# Patient Record
Sex: FEMALE | Race: WHITE | NOT HISPANIC OR LATINO | URBAN - METROPOLITAN AREA
[De-identification: names, ages, dates, MRNs, and addresses within clinical notes are randomized per-mention and may not be internally consistent; named-entity substitution may affect disease eponyms.]

---

## 2017-01-30 ENCOUNTER — IMPORTED ENCOUNTER (OUTPATIENT)
Dept: URBAN - METROPOLITAN AREA CLINIC 1 | Facility: CLINIC | Age: 72
End: 2017-01-30

## 2017-01-30 PROBLEM — H04.123: Noted: 2017-01-30

## 2017-01-30 PROBLEM — Z96.1: Noted: 2017-01-30

## 2017-01-30 PROBLEM — H35.033: Noted: 2017-01-30

## 2017-01-30 PROBLEM — H16.143: Noted: 2017-01-30

## 2017-01-30 PROBLEM — Z79.84: Noted: 2017-01-30

## 2017-01-30 PROBLEM — E11.9: Noted: 2017-01-30

## 2017-01-30 PROCEDURE — 92014 COMPRE OPH EXAM EST PT 1/>: CPT

## 2017-01-30 NOTE — PATIENT DISCUSSION
1.  DM Type II without sign of diabetic retinopathy and no blot heme on dilated retinal examination today OU No Macular Edema: Stable. Discussed the pathophysiology of diabetes and its effect on the eye and risk of blindness. Stressed the importance of strong glucose control. Advised of importance of at least yearly dilated examinations but to contact us immediately for any problems or concerns. 2. Type II diabetes controlled by oral medications. 3.  YAEL w/ PEK OU- Stable. The continuation of artificial tears were recommended. 4.  GR I Hypertensive Retinopathy OU- Stable continue HTN Control5. Pseudophakia OU- H/o Yag OU. 6.  Patient defers the refraction at today's visit7. Return for an appointment for a 27 in 1 year with Dr. Caroline Jackson.

## 2018-01-30 ENCOUNTER — IMPORTED ENCOUNTER (OUTPATIENT)
Dept: URBAN - METROPOLITAN AREA CLINIC 1 | Facility: CLINIC | Age: 73
End: 2018-01-30

## 2018-01-30 PROBLEM — H04.123: Noted: 2018-01-30

## 2018-01-30 PROBLEM — Z79.84: Noted: 2018-01-30

## 2018-01-30 PROBLEM — H35.033: Noted: 2018-01-30

## 2018-01-30 PROBLEM — H47.013: Noted: 2018-01-30

## 2018-01-30 PROBLEM — H43.813: Noted: 2018-01-30

## 2018-01-30 PROBLEM — Z96.1: Noted: 2018-01-30

## 2018-01-30 PROBLEM — E11.9: Noted: 2018-01-30

## 2018-01-30 PROBLEM — H16.143: Noted: 2018-01-30

## 2018-01-30 PROCEDURE — 92015 DETERMINE REFRACTIVE STATE: CPT

## 2018-01-30 PROCEDURE — 92014 COMPRE OPH EXAM EST PT 1/>: CPT

## 2018-01-30 NOTE — PATIENT DISCUSSION
1.  DM Type II without sign of diabetic retinopathy and no blot heme on dilated retinal examination today OU No Macular Edema: Stable. Discussed the pathophysiology of diabetes and its effect on the eye and risk of blindness. Stressed the importance of strong glucose control. Advised of importance of at least yearly dilated examinations but to contact us immediately for any problems or concerns. 2. Type II diabetes controlled by oral medications. 3.  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 YAEL w/ PEK OU- Stable. The continuation of artificial tears were recommended. 5.  PVD OU- Old stable. 6.  Pseudophakia OU - H/o Yag OU. 7. Return for an appointment for a 27 in 1 year with Dr. Radha Condon.

## 2019-01-31 ENCOUNTER — IMPORTED ENCOUNTER (OUTPATIENT)
Dept: URBAN - METROPOLITAN AREA CLINIC 1 | Facility: CLINIC | Age: 74
End: 2019-01-31

## 2019-01-31 PROBLEM — E11.9: Noted: 2019-01-31

## 2019-01-31 PROBLEM — H35.371: Noted: 2019-01-31

## 2019-01-31 PROBLEM — H35.033: Noted: 2019-01-31

## 2019-01-31 PROBLEM — H16.143: Noted: 2019-01-31

## 2019-01-31 PROBLEM — H04.123: Noted: 2019-01-31

## 2019-01-31 PROBLEM — Z96.1: Noted: 2019-01-31

## 2019-01-31 PROBLEM — H47.013: Noted: 2019-01-31

## 2019-01-31 PROBLEM — Z79.84: Noted: 2019-01-31

## 2019-01-31 PROBLEM — H43.813: Noted: 2019-01-31

## 2019-01-31 PROCEDURE — 92014 COMPRE OPH EXAM EST PT 1/>: CPT

## 2019-01-31 NOTE — PATIENT DISCUSSION
1.  DM Type II without sign of diabetic retinopathy and no blot heme on dilated retinal examination today OU No Macular Edema: Stable. Discussed the pathophysiology of diabetes and its effect on the eye and risk of blindness. Stressed the importance of strong glucose control. Advised of importance of at least yearly dilated examinations but to contact us immediately for any problems or concerns. 2. Type II diabetes controlled by oral medications. 3.  Epiretinal Membrane OD- New. Appears benign. Observe for change. 4. YAEL w/ PEK OU- The increase of artificial tears OU Q2hwa were recommended routinely. 5.  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 PVD OU - Old stable. 7.  Pseudophakia OU - Doing wellReturn for an appointment for 27 in 1 year with Dr. Cornell Ramsey.

## 2020-01-31 ENCOUNTER — IMPORTED ENCOUNTER (OUTPATIENT)
Dept: URBAN - METROPOLITAN AREA CLINIC 1 | Facility: CLINIC | Age: 75
End: 2020-01-31

## 2020-01-31 PROBLEM — H43.813: Noted: 2020-01-31

## 2020-01-31 PROBLEM — Z96.1: Noted: 2020-01-31

## 2020-01-31 PROBLEM — E11.9: Noted: 2020-01-31

## 2020-01-31 PROBLEM — H35.033: Noted: 2020-01-31

## 2020-01-31 PROBLEM — H35.371: Noted: 2020-01-31

## 2020-01-31 PROBLEM — Z79.84: Noted: 2020-01-31

## 2020-01-31 PROBLEM — H04.123: Noted: 2020-01-31

## 2020-01-31 PROBLEM — H16.143: Noted: 2020-01-31

## 2020-01-31 PROBLEM — H47.013: Noted: 2020-01-31

## 2020-01-31 PROCEDURE — 92014 COMPRE OPH EXAM EST PT 1/>: CPT

## 2020-01-31 NOTE — PATIENT DISCUSSION
1.  DM Type II (Oral Medication) without sign of diabetic retinopathy and no blot heme on dilated retinal examination today OU No Macular Edema:  Discussed the pathophysiology of diabetes and its effect on the eye and risk of blindness. Stressed the importance of strong glucose control. Advised of importance of at least yearly dilated examinations but to contact us immediately for any problems or concerns. 2. Epiretinal Membrane OD - Observe for change. 3. YAEL w/ PEK OU- Recommend ATs TID OU routinely 4. Pseudophakia OU - h/o YAG Cap OU 5. 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 PVD OU - RD precautions. Patient deferred [facility] Rx today. Return for an appointment in 1 year 27 with Dr. Amy Roth.

## 2021-01-26 ENCOUNTER — IMPORTED ENCOUNTER (OUTPATIENT)
Dept: URBAN - METROPOLITAN AREA CLINIC 1 | Facility: CLINIC | Age: 76
End: 2021-01-26

## 2021-01-26 PROBLEM — H16.143: Noted: 2021-01-26

## 2021-01-26 PROBLEM — H04.123: Noted: 2021-01-26

## 2021-01-26 PROBLEM — Z96.1: Noted: 2021-01-26

## 2021-01-26 PROBLEM — H43.813: Noted: 2021-01-26

## 2021-01-26 PROBLEM — H35.371: Noted: 2021-01-26

## 2021-01-26 PROBLEM — H35.033: Noted: 2021-01-26

## 2021-01-26 PROBLEM — Z79.84: Noted: 2021-01-26

## 2021-01-26 PROBLEM — H47.013: Noted: 2021-01-26

## 2021-01-26 PROBLEM — E11.9: Noted: 2021-01-26

## 2021-01-26 PROCEDURE — 92014 COMPRE OPH EXAM EST PT 1/>: CPT

## 2021-01-26 NOTE — PATIENT DISCUSSION
1.  DM Type II (Oral Medication) without sign of diabetic retinopathy and no blot heme on dilated retinal examination today OU No Macular Edema:  Discussed the pathophysiology of diabetes and its effect on the eye and risk of blindness. Stressed the importance of strong glucose control. Advised of importance of at least yearly dilated examinations but to contact us immediately for any problems or concerns. 2. Epiretinal Membrane OD - Observe for change. 3. YAEL w/ PEK OU- Recommend ATs TID OU routinely 4. Pseudophakia OU - H/o YAG Cap OU 5. 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 PVD OU - RD precautions. Patient deferred Manifest Rx today. Return for an appointment in 1 year 27 with Dr. Caroline Jackson.

## 2022-04-02 ASSESSMENT — TONOMETRY
OS_IOP_MMHG: 15
OD_IOP_MMHG: 13
OD_IOP_MMHG: 16
OD_IOP_MMHG: 14
OS_IOP_MMHG: 15
OD_IOP_MMHG: 15
OS_IOP_MMHG: 14
OS_IOP_MMHG: 13
OD_IOP_MMHG: 13
OS_IOP_MMHG: 13

## 2022-04-02 ASSESSMENT — VISUAL ACUITY
OS_SC: 20/20-1
OD_SC: 20/20
OD_SC: 20/20
OD_CC: J1
OS_CC: J1
OD_CC: J1
OD_SC: 20/20
OS_CC: J1
OS_SC: 20/20
OS_SC: 20/20
OU_CC: J1
OD_SC: 20/20
OS_SC: 20/25

## 2022-05-26 ENCOUNTER — COMPREHENSIVE EXAM (OUTPATIENT)
Dept: URBAN - METROPOLITAN AREA CLINIC 1 | Facility: CLINIC | Age: 77
End: 2022-05-26

## 2022-05-26 DIAGNOSIS — H35.033: ICD-10-CM

## 2022-05-26 DIAGNOSIS — H04.123: ICD-10-CM

## 2022-05-26 DIAGNOSIS — E11.9: ICD-10-CM

## 2022-05-26 DIAGNOSIS — H16.143: ICD-10-CM

## 2022-05-26 DIAGNOSIS — Z96.1: ICD-10-CM

## 2022-05-26 DIAGNOSIS — H43.813: ICD-10-CM

## 2022-05-26 DIAGNOSIS — H35.371: ICD-10-CM

## 2022-05-26 PROCEDURE — 92015 DETERMINE REFRACTIVE STATE: CPT

## 2022-05-26 PROCEDURE — 92014 COMPRE OPH EXAM EST PT 1/>: CPT

## 2022-05-26 ASSESSMENT — TONOMETRY
OD_IOP_MMHG: 14
OS_IOP_MMHG: 14

## 2022-05-26 ASSESSMENT — VISUAL ACUITY
OD_CC: 20/25
OS_CC: 20/25

## 2022-05-26 ASSESSMENT — KERATOMETRY
OD_AXISANGLE_DEGREES: 018
OS_AXISANGLE2_DEGREES: 74
OS_K1POWER_DIOPTERS: 44.75
OD_AXISANGLE2_DEGREES: 108
OD_K2POWER_DIOPTERS: 47.00
OS_AXISANGLE_DEGREES: 164
OS_K2POWER_DIOPTERS: 47.50
OD_K1POWER_DIOPTERS: 45.25

## 2024-04-25 ENCOUNTER — COMPREHENSIVE EXAM (OUTPATIENT)
Dept: URBAN - METROPOLITAN AREA CLINIC 1 | Facility: CLINIC | Age: 79
End: 2024-04-25

## 2024-04-25 DIAGNOSIS — E11.9: ICD-10-CM

## 2024-04-25 PROCEDURE — 99214 OFFICE O/P EST MOD 30 MIN: CPT

## 2024-04-25 ASSESSMENT — VISUAL ACUITY
OD_CC: 20/20
OS_CC: 20/25

## 2024-04-25 ASSESSMENT — TONOMETRY
OS_IOP_MMHG: 16
OD_IOP_MMHG: 18

## 2025-05-01 ENCOUNTER — COMPREHENSIVE EXAM (OUTPATIENT)
Age: 80
End: 2025-05-01

## 2025-05-01 DIAGNOSIS — Z96.1: ICD-10-CM

## 2025-05-01 DIAGNOSIS — H35.033: ICD-10-CM

## 2025-05-01 DIAGNOSIS — E11.9: ICD-10-CM

## 2025-05-01 DIAGNOSIS — H35.371: ICD-10-CM

## 2025-05-01 DIAGNOSIS — H04.123: ICD-10-CM

## 2025-05-01 DIAGNOSIS — H43.813: ICD-10-CM

## 2025-05-01 DIAGNOSIS — H16.143: ICD-10-CM

## 2025-05-01 PROCEDURE — 99214 OFFICE O/P EST MOD 30 MIN: CPT

## 2025-05-01 PROCEDURE — 92015 DETERMINE REFRACTIVE STATE: CPT
